# Patient Record
Sex: FEMALE | Race: WHITE | NOT HISPANIC OR LATINO | URBAN - METROPOLITAN AREA
[De-identification: names, ages, dates, MRNs, and addresses within clinical notes are randomized per-mention and may not be internally consistent; named-entity substitution may affect disease eponyms.]

---

## 2023-06-08 NOTE — ASU PATIENT PROFILE, ADULT - FALL HARM RISK - UNIVERSAL INTERVENTIONS
Bed in lowest position, wheels locked, appropriate side rails in place/Call bell, personal items and telephone in reach/Instruct patient to call for assistance before getting out of bed or chair/Non-slip footwear when patient is out of bed/[place] to call system/Physically safe environment - no spills, clutter or unnecessary equipment/Purposeful Proactive Rounding/Room/bathroom lighting operational, light cord in reach

## 2023-06-08 NOTE — ASU PATIENT PROFILE, ADULT - NS PREOP UNDERSTANDS INFO
spoke to patient to be NPO/NO solid foods after 06 am tomorrow. allow to drink water, or apple juice or Gatorade till 8  am , dress comfortable, leave all valuable at home, bring ID photo and insurance cards, escort arranged, address and telephone given to patient./yes

## 2023-06-09 ENCOUNTER — OUTPATIENT (OUTPATIENT)
Dept: OUTPATIENT SERVICES | Facility: HOSPITAL | Age: 38
LOS: 1 days | Discharge: HOME | End: 2023-06-09
Payer: COMMERCIAL

## 2023-06-09 VITALS
HEART RATE: 71 BPM | SYSTOLIC BLOOD PRESSURE: 114 MMHG | DIASTOLIC BLOOD PRESSURE: 65 MMHG | RESPIRATION RATE: 16 BRPM | OXYGEN SATURATION: 99 % | TEMPERATURE: 99 F

## 2023-06-09 VITALS
DIASTOLIC BLOOD PRESSURE: 70 MMHG | HEIGHT: 67 IN | OXYGEN SATURATION: 100 % | HEART RATE: 62 BPM | RESPIRATION RATE: 16 BRPM | SYSTOLIC BLOOD PRESSURE: 110 MMHG | TEMPERATURE: 98 F | WEIGHT: 143.96 LBS

## 2023-06-09 PROCEDURE — 88300 SURGICAL PATH GROSS: CPT | Mod: 26,59

## 2023-06-09 PROCEDURE — 88311 DECALCIFY TISSUE: CPT | Mod: 26

## 2023-06-09 PROCEDURE — 88305 TISSUE EXAM BY PATHOLOGIST: CPT | Mod: 26

## 2023-06-09 DEVICE — SHEET SILICONE .040-1.02 MM
Type: IMPLANTABLE DEVICE | Status: NON-FUNCTIONAL
Removed: 2023-06-09

## 2023-06-09 RX ORDER — SODIUM CHLORIDE 9 MG/ML
1000 INJECTION, SOLUTION INTRAVENOUS
Refills: 0 | Status: DISCONTINUED | OUTPATIENT
Start: 2023-06-09 | End: 2023-06-09

## 2023-06-09 RX ORDER — FENTANYL CITRATE 50 UG/ML
25 INJECTION INTRAVENOUS
Refills: 0 | Status: DISCONTINUED | OUTPATIENT
Start: 2023-06-09 | End: 2023-06-09

## 2023-06-09 RX ADMIN — FENTANYL CITRATE 25 MICROGRAM(S): 50 INJECTION INTRAVENOUS at 19:35

## 2023-06-09 RX ADMIN — FENTANYL CITRATE 25 MICROGRAM(S): 50 INJECTION INTRAVENOUS at 19:42

## 2023-06-09 RX ADMIN — FENTANYL CITRATE 25 MICROGRAM(S): 50 INJECTION INTRAVENOUS at 19:50

## 2023-06-09 RX ADMIN — FENTANYL CITRATE 25 MICROGRAM(S): 50 INJECTION INTRAVENOUS at 19:55

## 2023-06-09 NOTE — ASU DISCHARGE PLAN (ADULT/PEDIATRIC) - ASU DC SPECIAL INSTRUCTIONSFT
Change bloody nasal tip dressing as needed; start saline rinses tonight. Expect a lot of blood clots and fresh blood. This is normal.   No blowing nose for a week.   Use saline rinses every 3 hours while awake (about 4-5x/day).  No heavy lifting x a week.   No cardio x a week  See postop instructions from office.

## 2023-06-09 NOTE — BRIEF OPERATIVE NOTE - NSICDXBRIEFPROCEDURE_GEN_ALL_CORE_FT
PROCEDURES:  Nasal septal repair 09-Jun-2023 18:43:39  Lessow, Chiqui  Endoscopic anterior ethmoidectomy 09-Jun-2023 18:43:55  Lessow, Chiqui  Maxillary antrostomy, with tissue removal 09-Jun-2023 18:44:08  Lessow, Chiqui  Resection, submucosa, inferior turbinate, bilateral 09-Jun-2023 18:44:18  Lessow, Chiqui

## 2023-06-09 NOTE — PRE-ANESTHESIA EVALUATION ADULT - NSANTHOSAYNRD_GEN_A_CORE
No. MADALYN screening performed.  STOP BANG Legend: 0-2 = LOW Risk; 3-4 = INTERMEDIATE Risk; 5-8 = HIGH Risk

## 2023-06-09 NOTE — BRIEF OPERATIVE NOTE - NSICDXBRIEFPREOP_GEN_ALL_CORE_FT
PRE-OP DIAGNOSIS:  Deviated nasal septum 09-Jun-2023 18:44:28  Lessow, Chiqui  Chronic anterior ethmoidal sinusitis 09-Jun-2023 18:44:38  Lessow, Chiqui  Chronic maxillary sinusitis 09-Jun-2023 18:44:44  Lessow, Chiqui  Hypertrophy of inferior nasal turbinate 09-Jun-2023 18:44:52  Lessow, Chiqui

## 2023-06-09 NOTE — BRIEF OPERATIVE NOTE - NSICDXBRIEFPOSTOP_GEN_ALL_CORE_FT
POST-OP DIAGNOSIS:  Deviated nasal septum 09-Jun-2023 18:45:10  Lessow, Chiqui  Chronic anterior ethmoidal sinusitis 09-Jun-2023 18:45:21  Lessow, Chiqui  Chronic maxillary sinusitis 09-Jun-2023 18:45:48  Lessow, Chiqui  Hypertrophy of inferior nasal turbinate 09-Jun-2023 18:45:54  Lessow, Chiqui

## 2023-06-09 NOTE — ASU DISCHARGE PLAN (ADULT/PEDIATRIC) - NS MD DC FALL RISK RISK
For information on Fall & Injury Prevention, visit: [email or web address] OR  [email or web address] OR  https://www.cdc.gov/steadi/patient.html

## 2023-06-09 NOTE — ASU DISCHARGE PLAN (ADULT/PEDIATRIC) - CARE PROVIDER_API CALL
Chiqui Tirado  Otolaryngology  [address], [address]  [place], [place] [address]  Phone: [phone]  Fax: [phone]  Follow Up Time: 1 week

## 2023-06-09 NOTE — PRE-ANESTHESIA EVALUATION ADULT - NSANTHPEFT_GEN_ALL_CORE
CTA RRR
Implemented All Fall Risk Interventions:  [place] to call system. Call bell, personal items and telephone within reach. Instruct patient to call for assistance. Room bathroom lighting operational. Non-slip footwear when patient is off stretcher. Physically safe environment: no spills, clutter or unnecessary equipment. Stretcher in lowest position, wheels locked, appropriate side rails in place. Provide visual cue, wrist band, yellow gown, etc. Monitor gait and stability. Monitor for mental status changes and reorient to person, place, and time. Review medications for side effects contributing to fall risk. Reinforce activity limits and safety measures with patient and family.

## 2023-06-21 LAB — SURGICAL PATHOLOGY STUDY: SIGNIFICANT CHANGE UP

## (undated) DEVICE — DRAPE MEDIUM SHEET 44" X 70"

## (undated) DEVICE — DRSG TELFA 3 X 8

## (undated) DEVICE — MEDTRONIC INFLATOR KIT FOR NUVENT EM SINUS DILATION SYSTEM

## (undated) DEVICE — SLV COMPRESSION KNEE MED

## (undated) DEVICE — DRAPE MAYO STAND 23"

## (undated) DEVICE — SUT CHROMIC 4-0 18" PS-2

## (undated) DEVICE — GLV 6.5 PROTEXIS (WHITE)

## (undated) DEVICE — TUBING IRRIGATION STRAIGHT SHOT

## (undated) DEVICE — WARMING BLANKET LOWER ADULT

## (undated) DEVICE — DRSG GAUZE SPONGE 2X2" STERILE

## (undated) DEVICE — SUT CHROMIC 4-0 18" P-3

## (undated) DEVICE — DRAPE TOWEL BLUE 17" X 24"

## (undated) DEVICE — PACK RHINOPLASTY

## (undated) DEVICE — BLADE MEDTRONIC ENT QUADCUT ROTATABLE STRAIGHT 4MM X 13CM